# Patient Record
Sex: FEMALE | Race: ASIAN | NOT HISPANIC OR LATINO | URBAN - METROPOLITAN AREA
[De-identification: names, ages, dates, MRNs, and addresses within clinical notes are randomized per-mention and may not be internally consistent; named-entity substitution may affect disease eponyms.]

---
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
## 2024-01-19 ENCOUNTER — OFFICE VISIT (OUTPATIENT)
Dept: FAMILY MEDICINE CLINIC | Facility: CLINIC | Age: 24
End: 2024-01-19
Payer: COMMERCIAL

## 2024-01-19 VITALS
RESPIRATION RATE: 16 BRPM | SYSTOLIC BLOOD PRESSURE: 100 MMHG | DIASTOLIC BLOOD PRESSURE: 72 MMHG | HEIGHT: 61 IN | HEART RATE: 76 BPM | WEIGHT: 123 LBS | BODY MASS INDEX: 23.22 KG/M2 | OXYGEN SATURATION: 96 % | TEMPERATURE: 97.7 F

## 2024-01-19 DIAGNOSIS — Z00.00 ANNUAL PHYSICAL EXAM: Primary | ICD-10-CM

## 2024-01-19 DIAGNOSIS — Z91.018 MULTIPLE FOOD ALLERGIES: ICD-10-CM

## 2024-01-19 DIAGNOSIS — Z23 ENCOUNTER FOR IMMUNIZATION: ICD-10-CM

## 2024-01-19 PROBLEM — J45.20 MILD INTERMITTENT ASTHMA WITHOUT COMPLICATION: Status: RESOLVED | Noted: 2024-01-19 | Resolved: 2024-01-19

## 2024-01-19 PROBLEM — J45.909 EXTRINSIC ASTHMA WITHOUT COMPLICATION: Status: ACTIVE | Noted: 2024-01-19

## 2024-01-19 PROBLEM — J45.20 MILD INTERMITTENT ASTHMA WITHOUT COMPLICATION: Status: ACTIVE | Noted: 2024-01-19

## 2024-01-19 PROCEDURE — 99395 PREV VISIT EST AGE 18-39: CPT | Performed by: PHYSICIAN ASSISTANT

## 2024-01-19 PROCEDURE — 90715 TDAP VACCINE 7 YRS/> IM: CPT

## 2024-01-19 PROCEDURE — 90471 IMMUNIZATION ADMIN: CPT

## 2024-01-19 RX ORDER — EPINEPHRINE 0.3 MG/.3ML
0.3 INJECTION SUBCUTANEOUS ONCE
Qty: 0.6 ML | Refills: 0 | Status: SHIPPED | OUTPATIENT
Start: 2024-01-19 | End: 2024-01-19

## 2024-01-19 RX ORDER — MELATONIN
2000 DAILY
Start: 2024-01-19

## 2024-01-19 RX ORDER — FEXOFENADINE HCL 60 MG/1
30 TABLET, FILM COATED ORAL AS NEEDED
COMMUNITY

## 2024-01-19 RX ORDER — FLUTICASONE FUROATE 27.5 UG/1
SPRAY, METERED NASAL
COMMUNITY

## 2024-01-19 NOTE — PROGRESS NOTES
ADULT ANNUAL PHYSICAL  [facility]    NAME: Merary Carroll  AGE: 23 y.o. SEX: female  : 2000     DATE: 2024     Assessment and Plan:     Healthy 23 year old female    Immunizations and preventive care screenings were discussed with patient today. Appropriate education was printed on patient's after visit summary.    Counseling:  Alcohol/drug use: discussed moderation in alcohol intake, the recommendations for healthy alcohol use, and avoidance of illicit drug use.  Dental Health: discussed importance of regular tooth brushing, flossing, and dental visits.  Injury prevention: discussed safety/seat belts, safety helmets, smoke detectors, carbon dioxide detectors, and smoking near bedding or upholstery.  Sexual health: discussed sexually transmitted diseases, partner selection, use of condoms, avoidance of unintended pregnancy, and contraceptive alternatives.  Exercise: the importance of regular exercise/physical activity was discussed. Recommend exercise 3-5 times per week for at least 30 minutes.          Return in 1 year (on 2025).     Chief Complaint:     Chief Complaint   Patient presents with    New Patient Visit      History of Present Illness:     Adult Annual Physical   Patient here for a comprehensive physical exam. The patient reports no problems.    Diet and Physical Activity  Diet/Nutrition: well balanced diet.   Exercise: vigorous cardiovascular exercise, 5-7 times a week on average, and more than 2 hours on average.      Depression Screening  PHQ-2/9 Depression Screening    Little interest or pleasure in doing things: 0 - not at all  Feeling down, depressed, or hopeless: 0 - not at all  PHQ-2 Score: 0  PHQ-2 Interpretation: Negative depression screen       General Health  Sleep: sleeps well.   Hearing: normal - bilateral.  Vision: goes for regular eye exams.   Dental: regular dental visits.       /GYN  Health  Follows with gynecology? no   Last menstrual period: irregular  Scheduled with gyn       Review of Systems:     Review of Systems   Constitutional: Negative.    HENT: Negative.     Eyes: Negative.    Respiratory: Negative.     Cardiovascular: Negative.    Gastrointestinal: Negative.    Endocrine: Negative.    Genitourinary: Negative.    Musculoskeletal: Negative.    Skin: Negative.    Allergic/Immunologic: Negative.    Neurological: Negative.    Hematological: Negative.    Psychiatric/Behavioral: Negative.        Past Medical History:     Past Medical History:   Diagnosis Date    Allergic 2002    nuts, shellfish, latex, seasonal, dust, mold, pet dander      Past Surgical History:     Past Surgical History:   Procedure Laterality Date    WISDOM TOOTH EXTRACTION        Social History:     Social History     Socioeconomic History    Marital status: Single     Spouse name: None    Number of children: None    Years of education: None    Highest education level: None   Occupational History    None   Tobacco Use    Smoking status: Never    Smokeless tobacco: Never   Vaping Use    Vaping status: Never Used   Substance and Sexual Activity    Alcohol use: Yes     Comment: occasional social drink    Drug use: Never    Sexual activity: Never   Other Topics Concern    None   Social History Narrative    None     Social Determinants of Health     Financial Resource Strain: Not on file   Food Insecurity: Not on file   Transportation Needs: Not on file   Physical Activity: Not on file   Stress: Not on file   Social Connections: Not on file   Intimate Partner Violence: Not on file   Housing Stability: Not on file      Family History:     Family History   Problem Relation Age of Onset    No Known Problems Mother     Sleep apnea Father     Hypertension Maternal Grandmother     Stroke Maternal Grandmother     Hearing loss Maternal Grandmother         Loss from Viral inf    Dementia Maternal Grandfather         Parkinson's  "Dis with dementia      Current Medications:     Current Outpatient Medications   Medication Sig Dispense Refill    Calcium Carbonate Antacid (CALCIUM CARBONATE PO) Take 500 mg by mouth      cholecalciferol (VITAMIN D3) 1,000 units tablet Take 2 tablets (2,000 Units total) by mouth daily      EPINEPHrine (Auvi-Q) 0.3 mg/0.3 mL SOAJ Inject 0.3 mL (0.3 mg total) into a muscle once for 1 dose 0.6 mL 0    fexofenadine (Allegra Allergy) 60 MG tablet 30 mg if needed      fluticasone (Flonase Sensimist) 27.5 MCG/SPRAY nasal spray       Multiple Vitamins-Minerals (ONE DAILY MULTIVIT-MIN ADULT PO) Take by mouth       No current facility-administered medications for this visit.      Allergies:     Allergies   Allergen Reactions    Nuts - Food Allergy Abdominal Pain, Anaphylaxis, Drowsiness, Edema, Eye Swelling, Facial Swelling, Fever, Hives, Itching, Lip Swelling, Nasal Congestion, Palpitations, Shortness Of Breath, Swelling, Tachycardia, Throat Swelling and Tongue Swelling    Shellfish Allergy - Food Allergy Anaphylaxis    Latex Itching    Pollen Extract Allergic Rhinitis      Physical Exam:     /72   Pulse 76   Temp 97.7 °F (36.5 °C)   Resp 16   Ht 5' 0.5\" (1.537 m)   Wt 55.8 kg (123 lb)   SpO2 96%   BMI 23.63 kg/m²     Physical Exam  Constitutional:       Appearance: Normal appearance. She is well-developed and normal weight.   HENT:      Head: Normocephalic and atraumatic.      Right Ear: Hearing normal.      Left Ear: Hearing normal.      Mouth/Throat:      Pharynx: Uvula midline.   Eyes:      Extraocular Movements: Extraocular movements intact.      Conjunctiva/sclera: Conjunctivae normal.      Pupils: Pupils are equal, round, and reactive to light.   Neck:      Thyroid: No thyromegaly.   Cardiovascular:      Rate and Rhythm: Normal rate and regular rhythm.      Pulses: Normal pulses.      Heart sounds: Normal heart sounds. No murmur heard.  Pulmonary:      Effort: Pulmonary effort is normal.      Breath " sounds: Normal breath sounds.   Abdominal:      General: Abdomen is flat. Bowel sounds are normal. There is no distension.      Palpations: Abdomen is soft. There is no mass.      Tenderness: There is no abdominal tenderness.   Musculoskeletal:         General: Normal range of motion.      Cervical back: Normal range of motion and neck supple.   Lymphadenopathy:      Cervical: No cervical adenopathy.   Skin:     General: Skin is warm.   Neurological:      General: No focal deficit present.      Mental Status: She is alert and oriented to person, place, and time.      Cranial Nerves: No cranial nerve deficit.      Deep Tendon Reflexes: Reflexes normal.   Psychiatric:         Mood and Affect: Mood normal.         Behavior: Behavior normal.         Thought Content: Thought content normal.         Judgment: Judgment normal.          Nellie Cao PA-C   [facility]

## 2024-02-05 DIAGNOSIS — Z91.018 MULTIPLE FOOD ALLERGIES: ICD-10-CM

## 2024-02-05 RX ORDER — EPINEPHRINE 0.3 MG/.3ML
0.3 INJECTION SUBCUTANEOUS ONCE
Qty: 0.6 ML | Refills: 0 | Status: SHIPPED | OUTPATIENT
Start: 2024-02-05 | End: 2024-02-05

## 2024-07-11 ENCOUNTER — ANNUAL EXAM (OUTPATIENT)
Dept: OBGYN CLINIC | Facility: CLINIC | Age: 24
End: 2024-07-11
Payer: COMMERCIAL

## 2024-07-11 VITALS — WEIGHT: 120 LBS | BODY MASS INDEX: 23.05 KG/M2

## 2024-07-11 DIAGNOSIS — Z01.419 ENCOUNTER FOR ANNUAL ROUTINE GYNECOLOGICAL EXAMINATION: Primary | ICD-10-CM

## 2024-07-11 DIAGNOSIS — N92.6 IRREGULAR MENSES: ICD-10-CM

## 2024-07-11 PROCEDURE — 99385 PREV VISIT NEW AGE 18-39: CPT | Performed by: OBSTETRICS & GYNECOLOGY

## 2024-07-11 RX ORDER — TURMERIC 400 MG
CAPSULE ORAL
COMMUNITY

## 2024-07-11 RX ORDER — OMEGA-3-ACID ETHYL ESTERS 1 G/1
2 CAPSULE, LIQUID FILLED ORAL 2 TIMES DAILY
COMMUNITY

## 2024-07-11 NOTE — PROGRESS NOTES
Ambulatory Visit  Name: Merary Carroll      : 2000      MRN: [id number]  Encounter Provider: Viviana Serrato DO  Encounter Date: 2024   Encounter department: [facility]    Assessment & Plan   1. Encounter for annual routine gynecological examination  2. Irregular menses  -     TSH, 3rd generation  -     Follicle stimulating hormone; Future  -     Progesterone; Future  Pap smear done for cytology, reflex HPV.  Encouraged self breast examination as well as calcium supplementation.  Recommend check TSH, FSH, progesterone.  Reviewed menstrual diary.  Discussed treatment options for PMS, cycle control, and potential birth control in the future.  We discussed the risks and benefits of OCPs.  She will notify me if she is interested.  All questions answered at this time.  Return to office in 1 year or as needed    History of Present Illness     Merary Carroll is a 24 y.o. female who presents     This is a pleasant 24-year-old female G0 accompanied with her mother today presents for her first GYN exam.  She went through menarche at age 15.  She states her cycles were always irregular up until approximately 1 year ago.  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  She does not have a menstrual diary with her but feels that she is getting a menstrual cycle approximately every 4 to 5 weeks lasting 4 days with no breakthrough bleeding.  She does notice significant mood swings irritability crying spells a week before menstruation as well as hot flashes and bloating.  She gets cramping intermittently but tolerable.  She is never used tampons.  She has never been sexually active.  She did receive the Gardasil vaccine in 2016.    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  She was recently seen in the emergency room complaining of abdominal  Pain right lower quadrant.  She  underwent CT scan of the abdomen and pelvis which was negative for appendicitis, normal GYN anatomy.    She was seen by ENT where she noticed a change in her singing voice.  She was diagnosed with reflux placed on medication and had significant side effects.    Review of Systems   Constitutional:  Negative for fatigue, fever and unexpected weight change.   Respiratory:  Negative for cough, chest tightness, shortness of breath and wheezing.    Cardiovascular: Negative.  Negative for chest pain and palpitations.   Gastrointestinal: Negative.  Negative for abdominal distention, abdominal pain, blood in stool, constipation, diarrhea, nausea and vomiting.   Genitourinary: Negative.  Negative for difficulty urinating, dyspareunia, dysuria, flank pain, frequency, genital sores, hematuria, pelvic pain, urgency, vaginal bleeding, vaginal discharge and vaginal pain.   Skin:  Negative for rash.     Current Outpatient Medications on File Prior to Visit   Medication Sig Dispense Refill    Calcium Carbonate Antacid (CALCIUM CARBONATE PO) Take 500 mg by mouth      cholecalciferol (VITAMIN D3) 1,000 units tablet Take 2 tablets (2,000 Units total) by mouth daily      fexofenadine (Allegra Allergy) 60 MG tablet 30 mg if needed      fluticasone (Flonase Sensimist) 27.5 MCG/SPRAY nasal spray       Multiple Vitamins-Minerals (ONE DAILY MULTIVIT-MIN ADULT PO) Take by mouth      omega-3-acid ethyl esters (LOVAZA) 1 g capsule Take 2 g by mouth 2 (two) times a day      Turmeric 400 MG CAPS Take by mouth      EPINEPHrine (Auvi-Q) 0.3 mg/0.3 mL SOAJ Inject 0.3 mL (0.3 mg total) into a muscle once for 1 dose 0.6 mL 0     No current facility-administered medications on file prior to visit.      Objective     Wt 54.4 kg (120 lb)   LMP 06/23/2024 (Approximate)   BMI 23.05 kg/m²     Physical Exam  Constitutional:       Appearance: Normal appearance. She is well-developed.   HENT:      Head: Normocephalic and atraumatic.   Cardiovascular:       Rate and Rhythm: Normal rate and regular rhythm.   Pulmonary:      Effort: Pulmonary effort is normal.      Breath sounds: Normal breath sounds.   Chest:   Breasts:     Right: No inverted nipple, mass, nipple discharge, skin change or tenderness.      Left: No inverted nipple, mass, nipple discharge, skin change or tenderness.   Abdominal:      General: Bowel sounds are normal. There is no distension.      Palpations: Abdomen is soft.      Tenderness: There is no abdominal tenderness. There is no guarding or rebound.   Genitourinary:     Labia:         Right: No rash, tenderness or lesion.         Left: No rash, tenderness or lesion.       Vagina: Normal. No signs of injury. No vaginal discharge or tenderness.      Cervix: No cervical motion tenderness, discharge, friability, lesion or cervical bleeding.      Uterus: Not enlarged and not tender.       Adnexa:         Right: No mass, tenderness or fullness.          Left: No mass, tenderness or fullness.     Neurological:      Mental Status: She is alert.   Psychiatric:         Behavior: Behavior normal.     Using pediatric speculum, the vagina is well estrogenized.  She does have difficulty tolerating the exam.  There was difficulty in visualizing the cervix.  Administrative Statements   I have spent a total time of 55 minutes in caring for this patient on the day of the visit/encounter including Prognosis, Risks and benefits of tx options, Instructions for management, Impressions, Counseling / Coordination of care, Documenting in the medical record, Reviewing / ordering tests, medicine, procedures  , and Obtaining or reviewing history  .

## 2024-07-12 LAB
FSH SERPL-ACNC: 4.46 MIU/ML
PROGEST SERPL-MCNC: 0.24 NG/ML
TSH SERPL-ACNC: 2.89 UIU/ML (ref 0.45–5.33)

## 2024-07-16 LAB
CLINICAL INFO: NORMAL
CYTO CVX: NORMAL
CYTOLOGY CMNT CVX/VAG CYTO-IMP: NORMAL
DATE PREVIOUS BX: NORMAL
LMP START DATE: NORMAL
SL AMB PREV. PAP:: NORMAL
SPECIMEN SOURCE CVX/VAG CYTO: NORMAL

## 2024-07-17 ENCOUNTER — TELEPHONE (OUTPATIENT)
Age: 24
End: 2024-07-17

## 2024-07-17 NOTE — TELEPHONE ENCOUNTER
----- Message from Viviana Serrato DO sent at 7/15/2024 12:36 PM EDT -----  Please call the patient regarding her abnormal result. Inform labs incl FSH/TSH normal. Progesterone is little low c/w some anovulatory cycles, which can cause irregular menses. Please see my note, we discussed OCP for cycle control and she will let me know if interested. Otherwise RTO 1 yr

## 2024-07-17 NOTE — TELEPHONE ENCOUNTER
Patient states she does not want to start any hormonal medication now, would want to know if there is a more natural non hormonal alternative that the provider could suggest. 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

## 2024-07-17 NOTE — TELEPHONE ENCOUNTER
Patient returned call, tried to transfer to [facility] but they were on other calls.

## 2024-07-17 NOTE — TELEPHONE ENCOUNTER
Patient returned phone call regarding her lab work, pt was notified of Dr. Serrato's recommendations, pt verbalized understanding, pt stated she would like to talk to her mom in regards to OCP and will call back.

## 2024-07-18 ENCOUNTER — NURSE TRIAGE (OUTPATIENT)
Age: 24
End: 2024-07-18

## 2024-07-18 ENCOUNTER — TELEPHONE (OUTPATIENT)
Age: 24
End: 2024-07-18

## 2024-07-18 DIAGNOSIS — N92.6 IRREGULAR MENSES: Primary | ICD-10-CM

## 2024-07-18 RX ORDER — DESOGESTREL AND ETHINYL ESTRADIOL 21-5 (28)
1 KIT ORAL DAILY
Qty: 28 TABLET | Refills: 4 | Status: SHIPPED | OUTPATIENT
Start: 2024-07-18 | End: 2024-07-21 | Stop reason: SDUPTHER

## 2024-07-18 NOTE — TELEPHONE ENCOUNTER
Pts mother called to advise medication   Desogestrel-Ethinyl Estradiol 0.15-0.02/0.01 MG (21/5) sent to the wrong pharmacy.  Pharmacy updated to [facility] in [place].

## 2024-07-18 NOTE — TELEPHONE ENCOUNTER
Merary returned call, she would like to start OCP.   She is confirming her medication for acid reflux will not interact.  Updated med list to include Vonoprazan 20mg daily.  Advised not aware of any current interaction.  DR Serrato will review for possible interaction when prescribing.      Forwarded to Dr. Serrato to provide OCP rx.

## 2024-07-18 NOTE — TELEPHONE ENCOUNTER
Patient did not answer return call again.  Per HIPAA, left detailed message with all of Dr. Serrato's advice.  Advised call back if further questions or if she wants an Rx.

## 2024-07-18 NOTE — TELEPHONE ENCOUNTER
S/w Mom Sona Carroll (she is on communication consent) discussed the order to start the OCP. Mom had questions about what she should instruct her dtr- we reviewed when to start and how to take pill, reviewed provider advised to keep 3 month diary with her response to starting. Reviewed must take dose daily about the same time every day and also advised patient can callback or MyChart any non urgent questions or needs. Mom expressed her thankfulness for info.

## 2024-07-18 NOTE — TELEPHONE ENCOUNTER
Reason for Disposition   Nursing judgment    Answer Assessment - Initial Assessment Questions  Merary has been speaking with Dr. Serrato about in reference to starting birth control She wants to be sure no interaction with Voquenza 20mg.     Advised will update her med list to include Voquenza.  Currently not aware of interaction with OCP.  Informed when DR. Serrato selects an OCP for her EMR will alert to any known interactions with current med list.    Medication list updated. This triage closed, message added to prior ongoing conversation.    Protocols used: Information Only Call - No Triage-ADULT-[place]

## 2024-07-21 DIAGNOSIS — N92.6 IRREGULAR MENSES: ICD-10-CM

## 2024-07-21 RX ORDER — DESOGESTREL AND ETHINYL ESTRADIOL 21-5 (28)
1 KIT ORAL DAILY
Qty: 28 TABLET | Refills: 4 | Status: SHIPPED | OUTPATIENT
Start: 2024-07-21

## 2024-07-22 ENCOUNTER — TELEPHONE (OUTPATIENT)
Age: 24
End: 2024-07-22

## 2024-07-22 NOTE — TELEPHONE ENCOUNTER
Outgoing call to patient. Patient's mother on the line - ok per communication form. Results and recommendations reviewed with patient's mother. No further questions at this time.

## 2024-07-22 NOTE — TELEPHONE ENCOUNTER
Outgoing call to patient. Patient's mother on the line - ok per communication form. Results and recommendations reviewed with patient's mother. Informed Rx sent to requested pharmacy. No further questions at this time.

## 2024-08-11 DIAGNOSIS — N92.6 IRREGULAR MENSES: ICD-10-CM

## 2024-08-12 RX ORDER — DESOG-E.ESTRADIOL/E.ESTRADIOL 21-5 (28)
1 TABLET ORAL DAILY
Qty: 84 TABLET | Refills: 1 | Status: SHIPPED | OUTPATIENT
Start: 2024-08-12

## 2024-09-03 DIAGNOSIS — N92.6 IRREGULAR MENSES: ICD-10-CM

## 2024-09-03 RX ORDER — DESOGESTREL AND ETHINYL ESTRADIOL 21-5 (28)
1 KIT ORAL DAILY
Qty: 84 TABLET | Refills: 1 | Status: SHIPPED | OUTPATIENT
Start: 2024-09-03 | End: 2024-09-04 | Stop reason: SDUPTHER

## 2024-09-04 DIAGNOSIS — N92.6 IRREGULAR MENSES: ICD-10-CM

## 2024-09-04 RX ORDER — DESOGESTREL AND ETHINYL ESTRADIOL 21-5 (28)
1 KIT ORAL DAILY
Qty: 84 TABLET | Refills: 1 | Status: SHIPPED | OUTPATIENT
Start: 2024-09-04

## 2024-09-04 NOTE — TELEPHONE ENCOUNTER
Not a duplicate, change in pharmacy     Reason for call:   [x] Refill   [] Prior Auth  [] Other:     Office:   [] PCP/Provider -   [x] Specialty/Provider - Obgyn    Medication: Kariva 0.15-0.02/0.01 mg, take 1 tablet by mouth daily      Pharmacy: [facility]     Does the patient have enough for 3 days?   [x] Yes   [] No - Send as HP to POD

## 2024-10-10 ENCOUNTER — NURSE TRIAGE (OUTPATIENT)
Dept: OBGYN CLINIC | Facility: CLINIC | Age: 24
End: 2024-10-10

## 2024-10-10 NOTE — PATIENT COMMUNICATION
"Mom calling in (on communication form), states patient went on combined birth control pill 07/2024 due to low progesterone and feeling \"down\"/crying for no reason, hot flashes, irregular menses. Mom states when she started pill, her moods seemed to drastically improve. However, about 2 days ago, patient began feeling down again/crying for no reason. Denies SI/HI. Mom states patient also expresses other side effects including bloating/water retention, acne, breast tenderness/breasts feeling enlarged.    Patient and mom seeking advice on how patient should proceed. Advised if patient comfortable with continuing pill for now, can continue until provider able to give recommendations. Advised if symptoms are severe and patient would like to stop pill, can stop for now. Advised if patient stops pill abruptly, can have irregular bleeding and other side effects due to hormonal fluctuation. 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    Routing to provider for recommendation.  "

## 2024-10-10 NOTE — TELEPHONE ENCOUNTER
"Addendum: Mom requesting we call her number on file with updates from provider. [phone]     Reason for Disposition   Caller has NON-URGENT question and triager unable to answer question    Answer Assessment - Initial Assessment Questions  1. TYPE: \"What is the name of the birth control pill you are using?\"      desogestrel-ethinyl estradiol (Kariva) 0.15-0.02/0.01 MG  2. PACK TYPE: \"How do you take your birth control pill?\"    - 28-Day Cycle/Pack: Takes an active hormone pill days 1-21 and placebo pill on days 21-28    - 28-Day Cycle/21-Day Pack: Takes an active hormone pill days 1-21, followed by a pill-free week    - 3-Month Cycle/Pack: Takes an active pill for 3 months, followed by pill free week or placebo week    - Continuous: Takes an active hormone pill every day, continuously      28-Day/21-Day  3. START DATE: \"When did you first start taking this birth control pill?\"      07/2024  4. SYMPTOM: \"What is the main symptom (or question) you're concerned about?\"      Feeling down, water retention, breast tenderness/feeling enlarged, acne  5. ONSET: \"When did the s/s start?\"      2 days ago  6. VAGINAL BLEEDING: \"Are you having any unusual vaginal bleeding?\"    - NONE: no bleeding    - SPOTTING: spotting or pinkish / brownish mucous discharge; does not fill panty-liner or pad    - MILD: less than 1 pad / hour; less than patient's usual menstrual bleeding    - MODERATE: 1-2 pads / hour; small-medium blood clots (e.g., pea, grape, small coin)    - SEVERE: soaking 2 or more pads/hour for 2 or more hours; bleeding not contained by pads or   tampons      Denies  7. ABDOMEN OR PELVIC PAIN: \"Are you have any pain in your abdomen or pelvic area?\" (Scale: 0, 1-10; none, mild, moderate, severe)    - NONE (0): no pain    - MILD (1-3): doesn't interfere with normal activities, abdomen soft and not tender to touch     - MODERATE (4-7): interferes with normal activities or awakens from sleep, tender to touch     - SEVERE " "(8-10): excruciating pain, doubled over, unable to do any normal activities        Denies  8. MISSED/LATE PILLS: \"Did you miss or take any pills late?\" If Yes, ask: \"When? How many pills?\"    - NOTE: Pill is considered late/missed if taken more than 3 hours later than scheduled time.      Denies    Protocols used: Contraception - Birth Control Pills - Combined-ADULT-[place]    "

## 2024-12-03 ENCOUNTER — PATIENT MESSAGE (OUTPATIENT)
Dept: OBGYN CLINIC | Facility: CLINIC | Age: 24
End: 2024-12-03

## 2024-12-03 ENCOUNTER — TELEPHONE (OUTPATIENT)
Age: 24
End: 2024-12-03

## 2024-12-03 DIAGNOSIS — N92.6 IRREGULAR MENSES: ICD-10-CM

## 2024-12-03 NOTE — TELEPHONE ENCOUNTER
Pt. Is asking if her 3 month kariva prescription can be sent to another pharmacy? [facility]  [address] [place] [address]  [phone]

## 2024-12-04 RX ORDER — DESOGESTREL AND ETHINYL ESTRADIOL 21-5 (28)
1 KIT ORAL DAILY
Qty: 84 TABLET | Refills: 1 | Status: SHIPPED | OUTPATIENT
Start: 2024-12-04 | End: 2024-12-06 | Stop reason: SDUPTHER

## 2024-12-05 NOTE — PATIENT COMMUNICATION
Mom called the Rx line requesting for prescription  (Kariva) 0.15-0.02/0.01 MG (21/5) per tablet to be send to   [id number] - [place], [place] -

## 2024-12-06 DIAGNOSIS — N92.6 IRREGULAR MENSES: ICD-10-CM

## 2024-12-06 RX ORDER — DESOGESTREL AND ETHINYL ESTRADIOL 21-5 (28)
1 KIT ORAL DAILY
Qty: 84 TABLET | Refills: 0 | Status: SHIPPED | OUTPATIENT
Start: 2024-12-06

## 2024-12-06 NOTE — TELEPHONE ENCOUNTER
Reason for call:   [x] Refill   [] Prior Auth  [x] Other: Not a dup sent to wrong pharmacy.    Office:   [] PCP/Provider -   [x] Specialty/Provider - Viviana Serrato,      Medication: (Kariva) 0.15-0.02/0.01 MG     Dose/Frequency: 1 tab daily    Quantity: 84 tabs    Pharmacy: [facility] #[id number] [place] [address] - [facility] [address]      Does the patient have enough for 3 days?   [] Yes   [x] No - Send as HP to POD

## 2025-03-10 ENCOUNTER — TELEPHONE (OUTPATIENT)
Age: 25
End: 2025-03-10

## 2025-03-10 NOTE — TELEPHONE ENCOUNTER
Pts mother, Fernando, calling in. She reports her daughter Merary would like to discontinue  her birth control pills.  She does not like the side effects of bloating, weight gain, water retention and irregular bleeding.  She is at the end of a cycle/pack and advised that this is a good time to stop.  Advised she may have irregular periods after stopping as her body adjusts to hormone changes.  Mother verbalized understanding.

## 2025-03-27 ENCOUNTER — NURSE TRIAGE (OUTPATIENT)
Age: 25
End: 2025-03-27

## 2025-03-27 DIAGNOSIS — Z91.018 MULTIPLE FOOD ALLERGIES: ICD-10-CM

## 2025-03-27 RX ORDER — EPINEPHRINE 0.3 MG/.3ML
0.3 INJECTION SUBCUTANEOUS ONCE
Qty: 0.6 ML | Refills: 0 | Status: SHIPPED | OUTPATIENT
Start: 2025-03-27 | End: 2025-03-27 | Stop reason: SDUPTHER

## 2025-03-27 NOTE — TELEPHONE ENCOUNTER
Pt mom also requesting [id number] [address]  To [facility] [id number] [phone]

## 2025-03-27 NOTE — TELEPHONE ENCOUNTER
Medication: EPINEPHrine (Auvi-Q) 0.3 mg/0.3 mL SOAJ     Dose/Frequency: Inject 0.3 mL (0.3 mg total) into a muscle once for 1 dose     Quantity: 0.6mL    [facility]    Office:   [x] PCP/Provider -   [] Speciality/Provider -     Does the patient have enough for 3 days?   [x] Yes   [] No - Send as HP to POD

## 2025-03-27 NOTE — TELEPHONE ENCOUNTER
Regarding: MEDICATION PROBLEM  ----- Message from Carolina ZHANG sent at 3/27/2025  3:12 PM EDT -----  Pt mom called to request the rx is for the brand name AuviQ and not generic epipen.    Pt mom also requesting rx be changed from: [id number] [address] [facility]  To [facility] [place] [id number] [phone]

## 2025-03-27 NOTE — TELEPHONE ENCOUNTER
"Received message as triage. Attached message to earlier, ongoing thread.  Reason for Disposition   Follow-up information-only call to recent contact, no triage required    Answer Assessment - Initial Assessment Questions  1. REASON FOR CALL: \"What is the main reason for your call?\" or \"How can I best help you?\"      Change in pharmacy    Protocols used: Information Only Call - No Triage-Adult-[place]    "

## 2025-03-28 RX ORDER — EPINEPHRINE 0.3 MG/.3ML
0.3 INJECTION SUBCUTANEOUS ONCE
Qty: 0.6 ML | Refills: 0 | Status: SHIPPED | OUTPATIENT
Start: 2025-03-28 | End: 2025-03-28

## 2025-04-21 ENCOUNTER — TELEPHONE (OUTPATIENT)
Age: 25
End: 2025-04-21

## 2025-04-21 NOTE — TELEPHONE ENCOUNTER
Spoke to patient's mom and advised that we are not able to prescribe medication without an appointment and also can not prescribe out of state. Recommend pt find local urgent care for evaluation.

## 2025-04-21 NOTE — TELEPHONE ENCOUNTER
Patient's mother called to make virtual visit for patient for allergies and sore throat.  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  Explained for virtual patient has to be in state.   Patient unable to come in person since on tour.  Mother would like to know if medication can be prescribed.  Please advise and contact mother Fernando back.

## 2025-07-01 ENCOUNTER — OFFICE VISIT (OUTPATIENT)
Dept: FAMILY MEDICINE CLINIC | Facility: CLINIC | Age: 25
End: 2025-07-01
Payer: COMMERCIAL

## 2025-07-01 VITALS
HEIGHT: 61 IN | TEMPERATURE: 98.2 F | HEART RATE: 58 BPM | BODY MASS INDEX: 23.22 KG/M2 | WEIGHT: 123 LBS | SYSTOLIC BLOOD PRESSURE: 104 MMHG | RESPIRATION RATE: 16 BRPM | OXYGEN SATURATION: 98 % | DIASTOLIC BLOOD PRESSURE: 68 MMHG

## 2025-07-01 DIAGNOSIS — Z00.00 ANNUAL PHYSICAL EXAM: Primary | ICD-10-CM

## 2025-07-01 PROCEDURE — 99395 PREV VISIT EST AGE 18-39: CPT | Performed by: PHYSICIAN ASSISTANT

## 2025-07-01 RX ORDER — AZELASTINE 1 MG/ML
SPRAY, METERED NASAL
COMMUNITY
Start: 2025-06-01

## 2025-07-01 NOTE — PROGRESS NOTES
Adult Annual Physical  Name: Merary Carroll      : 2000      MRN: [id number]  Encounter Provider: Nellie Cao PA-C  Encounter Date: 2025   Encounter department: [facility]    :  Assessment & Plan  Annual physical exam             Preventive Screenings:  - Diabetes Screening: screening not indicated  - Cholesterol Screening: screening not indicated   - Hepatitis C screening: screening up-to-date   - Cervical cancer screening: screening up-to-date   - Colon cancer screening: screening not indicated   - Lung cancer screening: screening not indicated     Immunizations:  - Immunizations due: Prevnar 20 and HPV (Gardasil 9)  - Risks/benefits immunizations discussed           History of Present Illness     Adult Annual Physical:  Patient presents for annual physical.     Diet and Physical Activity:  - Diet/Nutrition: well balanced diet, limited junk food, low fat diet, low carb diet, consuming 3-5 servings of fruits/vegetables daily, adequate fiber intake and adequate whole grain intake. vegetarian/ occasionally consume chicken  - Exercise: moderate cardiovascular exercise, strength training exercises, 5-7 times a week on average and 1-2 hours on average. Profession requires constant activity, dancing/jumping    Depression Screening:  - PHQ-2 Score: 0    General Health:  - Sleep: 7-8 hours of sleep on average and > 8 hours of sleep on average.  - Hearing: normal hearing bilateral ears.  - Vision: most recent eye exam > 1 year ago. scheduled eye appt in PM of 25  - Dental: regular dental visits, brushes teeth twice daily and floss regularly.    /GYN Health:  - Follows with GYN: yes.   - Menopause: premenopausal.   - History of STDs: no  - Contraception:. discontinued pills due to side effects/currently not sexually active      Advanced Care Planning:  - Has an advanced directive?: no    - Has a durable medical POA?: no      Review of Systems      Objective  "  /68   Pulse 58   Temp 98.2 °F (36.8 °C) (Temporal)   Resp 16   Ht 5' 0.75\" (1.543 m)   Wt 55.8 kg (123 lb)   SpO2 98%   BMI 23.43 kg/m²     Physical Exam    "